# Patient Record
Sex: FEMALE | Race: ASIAN | Employment: OTHER | URBAN - METROPOLITAN AREA
[De-identification: names, ages, dates, MRNs, and addresses within clinical notes are randomized per-mention and may not be internally consistent; named-entity substitution may affect disease eponyms.]

---

## 2017-05-09 ENCOUNTER — HOSPITAL ENCOUNTER (OUTPATIENT)
Dept: MAMMOGRAPHY | Facility: HOSPITAL | Age: 67
Discharge: HOME | End: 2017-05-09
Attending: INTERNAL MEDICINE
Payer: MEDICARE

## 2017-05-09 DIAGNOSIS — Z12.31 SCREENING MAMMOGRAM, ENCOUNTER FOR: ICD-10-CM

## 2017-05-09 PROCEDURE — 77067 SCR MAMMO BI INCL CAD: CPT | Performed by: INTERNAL MEDICINE

## 2017-09-15 ENCOUNTER — HOSPITAL ENCOUNTER (EMERGENCY)
Facility: HOSPITAL | Age: 67
Discharge: HOME | End: 2017-09-15
Attending: EMERGENCY MEDICINE
Payer: MEDICARE

## 2017-09-15 ENCOUNTER — APPOINTMENT (OUTPATIENT)
Dept: GENERAL RADIOLOGY | Facility: HOSPITAL | Age: 67
End: 2017-09-15
Attending: EMERGENCY MEDICINE
Payer: MEDICARE

## 2017-09-15 VITALS
HEIGHT: 63 IN | BODY MASS INDEX: 25.52 KG/M2 | SYSTOLIC BLOOD PRESSURE: 133 MMHG | RESPIRATION RATE: 16 BRPM | WEIGHT: 144 LBS | HEART RATE: 64 BPM | TEMPERATURE: 98 F | DIASTOLIC BLOOD PRESSURE: 69 MMHG | OXYGEN SATURATION: 99 %

## 2017-09-15 DIAGNOSIS — M54.17 LUMBOSACRAL RADICULOPATHY: Primary | ICD-10-CM

## 2017-09-15 PROCEDURE — 99285 EMERGENCY DEPT VISIT HI MDM: CPT

## 2017-09-15 PROCEDURE — 96374 THER/PROPH/DIAG INJ IV PUSH: CPT

## 2017-09-15 PROCEDURE — 96361 HYDRATE IV INFUSION ADD-ON: CPT

## 2017-09-15 PROCEDURE — 96375 TX/PRO/DX INJ NEW DRUG ADDON: CPT

## 2017-09-15 PROCEDURE — 72110 X-RAY EXAM L-2 SPINE 4/>VWS: CPT | Performed by: EMERGENCY MEDICINE

## 2017-09-15 PROCEDURE — 99284 EMERGENCY DEPT VISIT MOD MDM: CPT

## 2017-09-15 RX ORDER — DIAZEPAM 5 MG/ML
INJECTION, SOLUTION INTRAMUSCULAR; INTRAVENOUS EVERY 30 MIN PRN
Status: DISCONTINUED | OUTPATIENT
Start: 2017-09-15 | End: 2017-09-15

## 2017-09-15 RX ORDER — SODIUM CHLORIDE 9 MG/ML
125 INJECTION, SOLUTION INTRAVENOUS CONTINUOUS
Status: DISCONTINUED | OUTPATIENT
Start: 2017-09-15 | End: 2017-09-15

## 2017-09-15 RX ORDER — DIAZEPAM 5 MG/1
TABLET ORAL EVERY 8 HOURS PRN
Qty: 20 TABLET | Refills: 0 | Status: SHIPPED | OUTPATIENT
Start: 2017-09-15 | End: 2017-09-25

## 2017-09-15 RX ORDER — OMEGA-3 FATTY ACIDS/FISH OIL 300-1000MG
CAPSULE ORAL
COMMUNITY
End: 2017-11-13 | Stop reason: ALTCHOICE

## 2017-09-15 RX ORDER — KETOROLAC TROMETHAMINE 30 MG/ML
15 INJECTION, SOLUTION INTRAMUSCULAR; INTRAVENOUS ONCE
Status: COMPLETED | OUTPATIENT
Start: 2017-09-15 | End: 2017-09-15

## 2017-09-15 NOTE — ED INITIAL ASSESSMENT (HPI)
Pt presents to the ED accompanied by family with complaints of discomfort from left lower back radiating down left leg for past 5 days. Pt denies injury. Pt states walking is difficult. Pt awake and alert,skin w/d,resps reg/unlabored.

## 2017-09-16 NOTE — ED PROVIDER NOTES
Patient Seen in: [facility] Emergency Department    History   Patient presents with:  Back Pain (musculoskeletal)    Stated Complaint: lower back/leg pain x 5 days    HPI    Patient comes in with lower back pain more on the left than the right and p SpO2 99%   BMI 25.51 kg/m²         Physical Exam    General: Well-developed, well-nourished, moderate distress secondary to pain  Head and neck: Normocephalic, atraumatic, pupils equal round and reactive to light, oropharynx clear   Neck: No JVD, no lympha medications    diazepam 5 MG Oral Tab  Take 0.5-1 tablets (2.5-5 mg total) by mouth every 8 (eight) hours as needed (as needed for muscle spasm). , Script printed, Disp-20 tablet, R-0

## 2017-09-16 NOTE — ED NOTES
Bedside report from [facility] received - pt reports decrease in pain,denies n/v. Spouse at bs. Pt received ice pack for left hip.

## 2017-09-19 ENCOUNTER — OFFICE VISIT (OUTPATIENT)
Dept: NEUROLOGY | Facility: CLINIC | Age: 67
End: 2017-09-19

## 2017-09-19 VITALS
RESPIRATION RATE: 16 BRPM | BODY MASS INDEX: 26 KG/M2 | SYSTOLIC BLOOD PRESSURE: 131 MMHG | DIASTOLIC BLOOD PRESSURE: 69 MMHG | WEIGHT: 147 LBS

## 2017-09-19 DIAGNOSIS — M54.16 LUMBAR RADICULOPATHY: Primary | ICD-10-CM

## 2017-09-19 PROCEDURE — 99204 OFFICE O/P NEW MOD 45 MIN: CPT | Performed by: OTHER

## 2017-09-19 RX ORDER — GABAPENTIN 100 MG/1
300 CAPSULE ORAL NIGHTLY
Qty: 90 CAPSULE | Refills: 0 | Status: SHIPPED | OUTPATIENT
Start: 2017-09-19 | End: 2017-10-31

## 2017-09-19 NOTE — PATIENT INSTRUCTIONS
Refill policies:    • Allow 2-3 business days for refills; controlled substances may take longer.   • Contact your pharmacy at least 5 days prior to running out of medication and have them send an electronic request or submit request through the [facility] have a procedure or additional testing performed. [facility]) will contact your insurance carrier to obtain pre-certification or prior authorization.     Unfortunately, [facility] has seen an increase in denial of payment even though the p

## 2017-09-19 NOTE — PROGRESS NOTES
Quinn video  Jeni Perez [id number] used for check in and exam.  Pt here for evaluation of left leg/ankle pain that has been present for about 1 week, causing intermittent pain, especially with walking. Pt seen in ED for same.

## 2017-09-19 NOTE — PROGRESS NOTES
Neurology H&P    Saroj Parnell Patient Status:  No patient class for patient encounter    1950 MRN [id number]   Location ED [facility], [address], [address] [facility] Attending No att. providers found   Hosp Day # 0 PCP PHYSICIAN NONSTAFF     Nichols mouth nightly. Disp:  Rfl:    Levothyroxine Sodium (SYNTHROID, LEVOTHROID) 75 MCG Oral Tab Take 75 mcg by mouth before breakfast. Disp:  Rfl:    aspirin 81 MG Oral Chew Tab Chew  by mouth daily.  Disp:  Rfl:    Multiple Vitamins-Minerals (CENTRUM) Oral Tab clubbing or cyanosis      Neurologic:   MENTAL STATUS: alert, ox3, normal attention, language and fund of knowledge.       CRANIAL NERVES II to XII: PERRLA, no ptosis or diplopia, EOM intact, facial sensation intact, strong eye closure and lower facial musc

## 2017-10-13 ENCOUNTER — HOSPITAL ENCOUNTER (OUTPATIENT)
Dept: MRI IMAGING | Facility: HOSPITAL | Age: 67
Discharge: HOME | End: 2017-10-13
Attending: Other
Payer: MEDICARE

## 2017-10-13 DIAGNOSIS — M54.16 LUMBAR RADICULOPATHY: ICD-10-CM

## 2017-10-13 PROCEDURE — 72158 MRI LUMBAR SPINE W/O & W/DYE: CPT | Performed by: OTHER

## 2017-10-13 PROCEDURE — A9575 INJ GADOTERATE MEGLUMI 0.1ML: HCPCS

## 2017-10-19 ENCOUNTER — OFFICE VISIT (OUTPATIENT)
Dept: NEUROLOGY | Facility: CLINIC | Age: 67
End: 2017-10-19

## 2017-10-19 DIAGNOSIS — M54.16 LUMBAR RADICULOPATHY: ICD-10-CM

## 2017-10-19 PROCEDURE — 95886 MUSC TEST DONE W/N TEST COMP: CPT | Performed by: OTHER

## 2017-10-19 PROCEDURE — 95909 NRV CNDJ TST 5-6 STUDIES: CPT | Performed by: OTHER

## 2017-10-19 NOTE — PROCEDURES
[facility] [place]  [address] [facility]  Phone- [phone]  Nerve Conduction & Electromyography Report            Patient: Victor Manuel Wilkinson  Patient ID: [id number]  Sex: Female  YOB: 1950  Age: 79 Jessica Tello Knee AH 10.94  9.4  64.5 ?50 82.5  Knee - Ankle 36 52 ?39   L TIBIAL (KNEE) - AH      Ankle AH 3.85 ?6.00 14.8 ?3.0 100  100 ?50.00 Ankle - AH 8        Knee AH 11.15  11.4  76.9 ?50 100  Knee - Ankle 34 47 ?39               F  Wave      Nerve Fmin    ms

## 2017-10-19 NOTE — PATIENT INSTRUCTIONS
Refill policies:    • Allow 2-3 business days for refills; controlled substances may take longer.   • Contact your pharmacy at least 5 days prior to running out of medication and have them send an electronic request or submit request through the [facility] have a procedure or additional testing performed. [facility]) will contact your insurance carrier to obtain pre-certification or prior authorization.     Unfortunately, [facility] has seen an increase in denial of payment even though the p

## 2017-10-31 ENCOUNTER — OFFICE VISIT (OUTPATIENT)
Dept: NEUROLOGY | Facility: CLINIC | Age: 67
End: 2017-10-31

## 2017-10-31 VITALS
BODY MASS INDEX: 25 KG/M2 | WEIGHT: 142 LBS | HEART RATE: 64 BPM | DIASTOLIC BLOOD PRESSURE: 66 MMHG | RESPIRATION RATE: 20 BRPM | SYSTOLIC BLOOD PRESSURE: 132 MMHG

## 2017-10-31 DIAGNOSIS — M54.16 LUMBAR RADICULOPATHY: Primary | ICD-10-CM

## 2017-10-31 PROCEDURE — 99213 OFFICE O/P EST LOW 20 MIN: CPT | Performed by: OTHER

## 2017-10-31 RX ORDER — GABAPENTIN 100 MG/1
300 CAPSULE ORAL NIGHTLY
Qty: 90 CAPSULE | Refills: 4 | Status: SHIPPED | OUTPATIENT
Start: 2017-10-31

## 2017-10-31 NOTE — PROGRESS NOTES
Neurology H&P    Maryjane Kapoor Patient Status:  No patient class for patient encounter    1950 MRN [id number]   Location [address] [place] [address], [address] [place] [address], [address] [place] [address] Attending No att. providers found   Hosp Day # 0 PCP None Pcp     Subjective: Prescriptions:  gabapentin 100 MG Oral Cap Take 3 capsules (300 mg total) by mouth nightly. Disp: 90 capsule Rfl: 0   Ibuprofen (ADVIL) 200 MG Oral Cap Take by mouth.  Disp:  Rfl:    Losartan Potassium (COZAAR) 100 MG Oral Tab  Disp:  Rfl:    Atorvastatin C taken for this visit.     Gen: Awake and in no apparent distress  HEENT: moist mucus membranes  Neck: Supple  Cardiovascular: Regular rate and rhythm, no murmur  Pulm: CTAB  GI: non-tender, normal bowel sounds  Skin: normal, dry  Extremities: No clubbing or today . She requests referral to pain management for other options of pain control. Plan:  1.  Lumbar radiclopathy  - EMG/NCS as noted above with lumbar radiculopathy  - MRI L spine imaging was reviewed with patient and also suggestive of bilateral fora

## 2017-11-13 ENCOUNTER — OFFICE VISIT (OUTPATIENT)
Dept: SURGERY | Facility: CLINIC | Age: 67
End: 2017-11-13

## 2017-11-13 VITALS
BODY MASS INDEX: 24.8 KG/M2 | SYSTOLIC BLOOD PRESSURE: 132 MMHG | HEART RATE: 72 BPM | WEIGHT: 140 LBS | HEIGHT: 63 IN | DIASTOLIC BLOOD PRESSURE: 70 MMHG

## 2017-11-13 DIAGNOSIS — M54.16 LUMBAR RADICULITIS: Primary | ICD-10-CM

## 2017-11-13 PROCEDURE — 99203 OFFICE O/P NEW LOW 30 MIN: CPT | Performed by: ANESTHESIOLOGY

## 2017-11-13 NOTE — PROGRESS NOTES
HPI:    Patient ID: Pooja Lozano is a 79year old female. HPI    Review of Systems         Current Outpatient Prescriptions:  gabapentin 100 MG Oral Cap Take 3 capsules (300 mg total) by mouth nightly.  Disp: 90 capsule Rfl: 4   Losartan Potassium (COZA

## 2017-11-13 NOTE — PATIENT INSTRUCTIONS
Refill policies:    • Allow 2-3 business days for refills; controlled substances may take longer.   • Contact your pharmacy at least 5 days prior to running out of medication and have them send an electronic request or submit request through the [facility] have a procedure or additional testing performed. [facility]) will contact your insurance carrier to obtain pre-certification or prior authorization.     Unfortunately, [facility] has seen an increase in denial of payment even though the p

## 2017-11-14 NOTE — PROGRESS NOTES
Name: Dc Perkins   : 1950   DOS: 2017     Chief complaint: Low back pain    History of present illness: Dc Perkins is a 79year old female complaining of  pain in the lower back for 15 years.   She had physical therapy and pain got bett Smoking status: Never Smoker                                                              Smokeless tobacco: Never Used                      Alcohol use:  Yes              Comment: rare glass of wine      Review of  other systems:  Constitutional: negat bilaterally. Phalen’s and Tinnel’s sign is negative. Lhermitte’s test is negative.     Motor Examination:    (R)   (L)  Deltoid:        5    5  Biceps:       5    5  Triceps:      5    5  Wrist Extension:     5    5  Wrist Flexsion:                 5    5 N    N   Ankle Clonus:    N                          N  Sensory Examination:    (R)   (L)   LI:      N                          N   L2:      N     N   L3:      N               N   L4:      N                          N   L5:      N spondylosis. Plan: My recommendation would be to continue physical therapy and if she fails that therapy she might benefit from series of 3 transforaminal lumbar epidural steroid injection as needed basis.  Thank you very much for referring the patient

## 2018-04-28 ENCOUNTER — HOSPITAL ENCOUNTER (OUTPATIENT)
Dept: MRI IMAGING | Facility: HOSPITAL | Age: 68
Discharge: HOME | End: 2018-04-28
Attending: OTOLARYNGOLOGY
Payer: MEDICARE

## 2018-04-28 DIAGNOSIS — H90.5 HEARING LOSS, SENSORINEURAL, UNILATERAL: ICD-10-CM

## 2018-04-28 PROCEDURE — A9576 INJ PROHANCE MULTIPACK: HCPCS | Performed by: OTOLARYNGOLOGY

## 2018-04-28 PROCEDURE — 70553 MRI BRAIN STEM W/O & W/DYE: CPT | Performed by: OTOLARYNGOLOGY

## 2018-05-12 ENCOUNTER — HOSPITAL ENCOUNTER (OUTPATIENT)
Dept: MAMMOGRAPHY | Facility: HOSPITAL | Age: 68
Discharge: HOME | End: 2018-05-12
Attending: INTERNAL MEDICINE
Payer: MEDICARE

## 2018-05-12 DIAGNOSIS — Z12.31 ENCOUNTER FOR SCREENING MAMMOGRAM FOR MALIGNANT NEOPLASM OF BREAST: ICD-10-CM

## 2018-05-12 PROCEDURE — 77063 BREAST TOMOSYNTHESIS BI: CPT | Performed by: INTERNAL MEDICINE

## 2018-05-12 PROCEDURE — 77067 SCR MAMMO BI INCL CAD: CPT | Performed by: INTERNAL MEDICINE

## 2019-02-11 PROBLEM — M17.11 PRIMARY OSTEOARTHRITIS OF RIGHT KNEE: Status: ACTIVE | Noted: 2019-02-11

## 2021-11-18 ENCOUNTER — ORDER TRANSCRIPTION (OUTPATIENT)
Dept: ADMINISTRATIVE | Facility: HOSPITAL | Age: 71
End: 2021-11-18

## 2021-11-18 DIAGNOSIS — Z01.818 PREOP EXAMINATION: Primary | ICD-10-CM

## 2021-11-18 DIAGNOSIS — Z11.59 ENCOUNTER FOR SCREENING FOR OTHER VIRAL DISEASES: ICD-10-CM

## 2021-11-26 ENCOUNTER — LAB ENCOUNTER (OUTPATIENT)
Dept: LAB | Facility: HOSPITAL | Age: 71
End: 2021-11-26
Attending: INTERNAL MEDICINE
Payer: MEDICARE

## 2021-11-26 DIAGNOSIS — Z11.59 ENCOUNTER FOR SCREENING FOR OTHER VIRAL DISEASES: ICD-10-CM

## 2021-11-26 DIAGNOSIS — Z01.818 PREOP EXAMINATION: ICD-10-CM

## 2021-12-08 ENCOUNTER — ORDER TRANSCRIPTION (OUTPATIENT)
Dept: ADMINISTRATIVE | Facility: HOSPITAL | Age: 71
End: 2021-12-08

## 2021-12-08 DIAGNOSIS — Z01.818 PREOP EXAMINATION: ICD-10-CM

## 2021-12-08 DIAGNOSIS — Z11.59 ENCOUNTER FOR SCREENING FOR OTHER VIRAL DISEASES: Primary | ICD-10-CM

## 2021-12-13 ENCOUNTER — LAB ENCOUNTER (OUTPATIENT)
Dept: LAB | Facility: HOSPITAL | Age: 71
End: 2021-12-13
Payer: MEDICARE

## 2021-12-13 DIAGNOSIS — Z11.59 ENCOUNTER FOR SCREENING FOR OTHER VIRAL DISEASES: ICD-10-CM

## 2021-12-13 DIAGNOSIS — Z01.818 PREOP EXAMINATION: ICD-10-CM

## 2022-02-18 ENCOUNTER — ORDER TRANSCRIPTION (OUTPATIENT)
Dept: ADMINISTRATIVE | Facility: HOSPITAL | Age: 72
End: 2022-02-18

## 2022-02-19 ENCOUNTER — LAB ENCOUNTER (OUTPATIENT)
Dept: LAB | Facility: HOSPITAL | Age: 72
End: 2022-02-19
Attending: INTERNAL MEDICINE
Payer: MEDICARE

## 2022-02-19 DIAGNOSIS — Z01.818 PREOP EXAMINATION: ICD-10-CM

## 2022-02-20 LAB — SARS-COV-2 RNA RESP QL NAA+PROBE: NOT DETECTED

## (undated) DIAGNOSIS — Z01.818 PREOP EXAMINATION: Primary | ICD-10-CM

## (undated) NOTE — ED AVS SNAPSHOT
Jorge Baird   MRN: [id number]    Department:  [facility] Emergency Department   Date of Visit:  9/15/2017           Disclosure     Insurance plans vary and the physician(s) referred by the ER may not be covered by your plan.  Please contact your If you have been prescribed any medication(s), please fill your prescription right away and begin taking the medication(s) as directed    If the emergency physician has read X-rays, these will be re-interpreted by a radiologist.  If there is a significant